# Patient Record
Sex: FEMALE | Race: AMERICAN INDIAN OR ALASKA NATIVE
[De-identification: names, ages, dates, MRNs, and addresses within clinical notes are randomized per-mention and may not be internally consistent; named-entity substitution may affect disease eponyms.]

---
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
## 2025-05-02 ENCOUNTER — DASHBOARD ENCOUNTERS (OUTPATIENT)
Age: 20
End: 2025-05-02

## 2025-05-02 ENCOUNTER — OFFICE VISIT (OUTPATIENT)
Dept: URBAN - METROPOLITAN AREA CLINIC 118 | Facility: CLINIC | Age: 20
End: 2025-05-02
Payer: COMMERCIAL

## 2025-05-02 DIAGNOSIS — O21.0 HYPEREMESIS GRAVIDARUM: ICD-10-CM

## 2025-05-02 PROBLEM — 14094001: Status: ACTIVE | Noted: 2025-05-02

## 2025-05-02 PROCEDURE — 99204 OFFICE O/P NEW MOD 45 MIN: CPT | Performed by: STUDENT IN AN ORGANIZED HEALTH CARE EDUCATION/TRAINING PROGRAM

## 2025-05-02 NOTE — HPI-TODAY'S VISIT:
The patient is a 20-year-old female who presents for evaluation.  The patient is currently pregnant and was recently told that she may have hyperemesis gravidarum, and referred to GI.  Her blood sugars are generally in the 200s, 140s when fasting.  The patient has never had an upper endoscopy before.  She is currently about 17 weeks pregnant.  She has been started on Reglan and Pepcid for gastroparesis, however we have no formal gastric emptying study.